# Patient Record
Sex: FEMALE | Race: WHITE | NOT HISPANIC OR LATINO
[De-identification: names, ages, dates, MRNs, and addresses within clinical notes are randomized per-mention and may not be internally consistent; named-entity substitution may affect disease eponyms.]

---

## 2017-03-22 ENCOUNTER — TRANSCRIPTION ENCOUNTER (OUTPATIENT)
Age: 47
End: 2017-03-22

## 2017-03-30 ENCOUNTER — TRANSCRIPTION ENCOUNTER (OUTPATIENT)
Age: 47
End: 2017-03-30

## 2017-08-31 ENCOUNTER — TRANSCRIPTION ENCOUNTER (OUTPATIENT)
Age: 47
End: 2017-08-31

## 2018-04-23 ENCOUNTER — TRANSCRIPTION ENCOUNTER (OUTPATIENT)
Age: 48
End: 2018-04-23

## 2018-05-01 ENCOUNTER — TRANSCRIPTION ENCOUNTER (OUTPATIENT)
Age: 48
End: 2018-05-01

## 2018-10-23 ENCOUNTER — TRANSCRIPTION ENCOUNTER (OUTPATIENT)
Age: 48
End: 2018-10-23

## 2019-06-22 ENCOUNTER — TRANSCRIPTION ENCOUNTER (OUTPATIENT)
Age: 49
End: 2019-06-22

## 2019-12-28 ENCOUNTER — TRANSCRIPTION ENCOUNTER (OUTPATIENT)
Age: 49
End: 2019-12-28

## 2020-01-19 ENCOUNTER — TRANSCRIPTION ENCOUNTER (OUTPATIENT)
Age: 50
End: 2020-01-19

## 2020-02-29 ENCOUNTER — TRANSCRIPTION ENCOUNTER (OUTPATIENT)
Age: 50
End: 2020-02-29

## 2020-10-23 ENCOUNTER — NON-APPOINTMENT (OUTPATIENT)
Age: 50
End: 2020-10-23

## 2020-10-23 ENCOUNTER — APPOINTMENT (OUTPATIENT)
Dept: OPHTHALMOLOGY | Facility: CLINIC | Age: 50
End: 2020-10-23
Payer: COMMERCIAL

## 2020-10-23 PROCEDURE — 92004 COMPRE OPH EXAM NEW PT 1/>: CPT

## 2020-10-23 PROCEDURE — 99072 ADDL SUPL MATRL&STAF TM PHE: CPT

## 2020-10-23 PROCEDURE — 92015 DETERMINE REFRACTIVE STATE: CPT

## 2021-10-18 ENCOUNTER — APPOINTMENT (OUTPATIENT)
Dept: INTERNAL MEDICINE | Facility: CLINIC | Age: 51
End: 2021-10-18

## 2022-01-26 ENCOUNTER — APPOINTMENT (OUTPATIENT)
Dept: INTERNAL MEDICINE | Facility: CLINIC | Age: 52
End: 2022-01-26

## 2022-07-10 ENCOUNTER — EMERGENCY (EMERGENCY)
Facility: HOSPITAL | Age: 52
LOS: 1 days | Discharge: HOME | End: 2022-07-10
Attending: STUDENT IN AN ORGANIZED HEALTH CARE EDUCATION/TRAINING PROGRAM
Payer: COMMERCIAL

## 2022-07-10 VITALS
SYSTOLIC BLOOD PRESSURE: 120 MMHG | OXYGEN SATURATION: 97 % | HEART RATE: 95 BPM | TEMPERATURE: 99 F | HEIGHT: 66 IN | RESPIRATION RATE: 18 BRPM | DIASTOLIC BLOOD PRESSURE: 85 MMHG

## 2022-07-10 PROCEDURE — 73502 X-RAY EXAM HIP UNI 2-3 VIEWS: CPT | Mod: 26,RT

## 2022-07-10 PROCEDURE — 73080 X-RAY EXAM OF ELBOW: CPT

## 2022-07-10 PROCEDURE — 73660 X-RAY EXAM OF TOE(S): CPT

## 2022-07-10 PROCEDURE — 73080 X-RAY EXAM OF ELBOW: CPT | Mod: 26,RT

## 2022-07-10 PROCEDURE — 74177 CT ABD & PELVIS W/CONTRAST: CPT | Mod: 26,MA

## 2022-07-10 PROCEDURE — 99285 EMERGENCY DEPT VISIT HI MDM: CPT | Mod: 25

## 2022-07-10 PROCEDURE — 73502 X-RAY EXAM HIP UNI 2-3 VIEWS: CPT

## 2022-07-10 PROCEDURE — 71260 CT THORAX DX C+: CPT | Mod: MA

## 2022-07-10 PROCEDURE — 71260 CT THORAX DX C+: CPT | Mod: 26,MA

## 2022-07-10 PROCEDURE — 74177 CT ABD & PELVIS W/CONTRAST: CPT | Mod: MA

## 2022-07-10 PROCEDURE — 99284 EMERGENCY DEPT VISIT MOD MDM: CPT | Mod: 25

## 2022-07-10 PROCEDURE — 73610 X-RAY EXAM OF ANKLE: CPT | Mod: 26,LT

## 2022-07-10 PROCEDURE — 73610 X-RAY EXAM OF ANKLE: CPT

## 2022-07-10 PROCEDURE — 73660 X-RAY EXAM OF TOE(S): CPT | Mod: 26,LT

## 2022-07-10 PROCEDURE — 72170 X-RAY EXAM OF PELVIS: CPT

## 2022-07-10 RX ORDER — TETANUS TOXOID, REDUCED DIPHTHERIA TOXOID AND ACELLULAR PERTUSSIS VACCINE, ADSORBED 5; 2.5; 8; 8; 2.5 [IU]/.5ML; [IU]/.5ML; UG/.5ML; UG/.5ML; UG/.5ML
0.5 SUSPENSION INTRAMUSCULAR ONCE
Refills: 0 | Status: COMPLETED | OUTPATIENT
Start: 2022-07-10 | End: 2022-07-10

## 2022-07-10 RX ORDER — LIDOCAINE 4 G/100G
1 CREAM TOPICAL ONCE
Refills: 0 | Status: COMPLETED | OUTPATIENT
Start: 2022-07-10 | End: 2022-07-10

## 2022-07-10 RX ORDER — FENTANYL CITRATE 50 UG/ML
25 INJECTION INTRAVENOUS ONCE
Refills: 0 | Status: DISCONTINUED | OUTPATIENT
Start: 2022-07-10 | End: 2022-07-10

## 2022-07-10 RX ADMIN — LIDOCAINE 1 PATCH: 4 CREAM TOPICAL at 17:23

## 2022-07-10 NOTE — ED PROCEDURE NOTE - CPROC ED INFORMED CONSENT1
Applied
Benefits, risks, and possible complications of procedure explained to patient/caregiver who verbalized understanding and gave verbal consent.

## 2022-07-10 NOTE — ED PROVIDER NOTE - ATTENDING CONTRIBUTION TO CARE
I, Escobar Anderson, performed a history and physical exam of the patient and discussed their management with the resident and/or advanced care provider. I reviewed the resident and/or advanced care provider's note and agree with the documented findings and plan of care. I was present and available for all procedures.    53y/o Female w/ no PMH presenting with right sided rib pain and hip pain as well as LT sided ankle and LE digit pain after falling 6 steps this AM shortly after 0530. Pt states that she awoke at 0530 and attempted to ambulate to the bathroom when she fell down her home steps for a total fo 6 steps. She recalls the events before and after the fall while stating that she landed on her right side. She denies LOC and striking her head; she does not take any blood thinners. Pt called for her 's help who had to lift her and carry her back upstairs. Pt spoke to PCP via video call and was recommended to come into ED due to inability to bear weight on LT ankle and visible swelling.    Well appearing and in NAD, head normal appearing atraumatic, trachea midline, no respiratory distress, lungs cta bilaterally, rrr no murmurs, soft ruq abd ttp ND abdomen, no visible extremity deformities, Alert and oriented, non focal neuro exam, skin warm and dry, normal affect and mood, no c/t/l spine ttp, right post rib pain lower rib cage, r gt pain FROM bilateral hips, left lat ankle pain and left 1st 2nd digit pain, slight oozing from 1st nailbed without nail or nailbed injury slight r elbow ttp FROM bilateral ue's neurovasc intact all 4 ext    well appearing but concerning for msk vs fractures with abd ttp will eval with ctchest ap eval for abd injuries and rib fractures analgesia prn. otherwise no syncope chest pain shortness of breath to suggest pe dissection acs not on ac unlikely ich. dispo pending eval

## 2022-07-10 NOTE — ED PROVIDER NOTE - PATIENT PORTAL LINK FT
You can access the FollowMyHealth Patient Portal offered by [facility] by registering at the following website: [email or web address]. By joining [facility]’s FollowMyHealth portal, you will also be able to view your health information using other applications (apps) compatible with our system.

## 2022-07-10 NOTE — ED PROVIDER NOTE - CLINICAL SUMMARY MEDICAL DECISION MAKING FREE TEXT BOX
51 y/o Female presenting with a mechanical fall. Pt described chest pain and was found to be reproducible, in a patient without cardiac risk factors, most likely MSK in etiology can cannot rule out rib fracture so imaging warranted. Pt also has new RUQ tenderness to palpation and RT costal margin pain which is concerning for potential rib fracture involving either peritoneum or diaphragm which would warrant a CT abd and pelvis to further evaluate. Pt is also presenting with signs of ankle trauma concerning due to inability to bear weight indicating the need for ankle X- ray.    -Newton Torrez, MS4 53 y/o Female with h/o T2DM (Metformin, Ozpemic) p/w mechanical fall at home now with reproducible chest/flank pain & RUQ pain. Given LUIS, high likelihood for MSK pathology as etiology for symptoms, potentially with rib & foot fx. Pt also has new RUQ tenderness to palpation and RT costal margin pain which is concerning for potential rib fracture; will assess for potential intra-abdominal involvement with CT. Pt is also presenting with signs of ankle trauma concerning due to inability to bear weight indicating the need for ankle X- ray.  - Pain control  - Imaging as noted    -Newton Torrez, MS4  Reviewed and edits made by Arnaldo Hauser MD PGY-2

## 2022-07-10 NOTE — ED PROCEDURE NOTE - ATTENDING CONTRIBUTION TO CARE
I, Escobar Anderson, performed a history and physical exam of the patient and discussed their management with the resident and/or advanced care provider. I reviewed the resident and/or advanced care provider's note and agree with the documented findings and plan of care. I was present and available for all procedures.

## 2022-07-10 NOTE — ED PROVIDER NOTE - NS ED ROS FT
Constitutional: No fevers.  HEENT: No visual disturbances.  Pulm: Difficulty taking full breath since fall. No cough.   Cardio: Moderate chest pain reported on right side.   GI: Moderate right sided abdominal pain since fall.  Neuro: No headaches.   MSK: Right lower rib pain, right lateral hip pain, left ankle pain, left 1st and 2nd LE digit pain. GEN: No fever, chills, night sweats, weight loss  EYES: No vision changes, irritation, itchiness  ENT: No sore throat or difficulty swallowing  RESP: as per HPI  CARDIOVASCULAR: No palpitations  GI: No nausea/vomiting, diarrhea, constipation  :  No change in urine output; no dysuria, hematuria, or discharge  MSK: as per HPI  SKIN: No rashes  NEURO: No abnormal movements; no numbness or tingling  Remainder negative, except as per the HPI

## 2022-07-10 NOTE — ED PROVIDER NOTE - OBJECTIVE STATEMENT
The patient is a 53y/o Female w/ no PMH presenting with right sided rib pain and hip pain as well as LT sided ankle and LE digit pain after falling 6 steps this AM shortly after 0530. Pt states that she awoke at 0530 and attempted to ambulate to the bathroom when she fell down her home steps for a total fo 6 steps. She recalls the events before and after the fall while stating that she landed on her right side. She denies LOC and striking her head. Pt called for her 's help who had to lift her and carry her back upstairs. Pt spoke to PCP via video call and was recommended to come into ED due to inability to bear weight on LT ankle and visible swelling. The patient is a 51y/o Female w/ no PMH presenting with right sided rib pain and hip pain as well as LT sided ankle and LE digit pain after falling 6 steps this AM shortly after 0530. Pt states that she awoke at 0530 and attempted to ambulate to the bathroom when she fell down her home steps for a total fo 6 steps. She recalls the events before and after the fall while stating that she landed on her right side. She denies LOC and striking her head; she does not take any blood thinners. Pt called for her 's help who had to lift her and carry her back upstairs. Pt spoke to PCP via video call and was recommended to come into ED due to inability to bear weight on LT ankle and visible swelling.

## 2022-07-10 NOTE — ED PROVIDER NOTE - PHYSICAL EXAMINATION
General: Patient is in mild distress and laying on hospital bed.  HEENT: EOMI, Head is nontender to palpation, atraumatic, and normocephalic. Sclera clear.   Cardiac: Regular rate, with no murmurs or rubs appreciated.  Pulm: Clear to auscultation bilaterally, with no crackles, rhonchi, or wheezes appreciated. Symmetric expansion of chest cavity. Pain noted when asked to take deep breath.   Abd: +ve RUQ pain to palpation. Soft and nondistended abdomen. No guarding. No bruising noted.   Ext: 1 inch ecchymosis noted on pt's right medial humeral epicondyle. Pain with Rt hip flexion. Pain with Tt hip internal and external rotation. LT malleolar swelling that is tender to palpation. LT LE digits 1 and 2 tender to palpation and manipulation.   Skin: Skin is warm and dry.  Neuro: No focal deficits. No neck or spinal tenderness. General: Patient is in mild distress and laying on hospital bed.  HEENT: EOMI, Head is nontender to palpation, atraumatic, and normocephalic. Sclera clear.   Cardiac: Regular rate, with no murmurs or rubs appreciated.  Pulm: Clear to auscultation bilaterally, with no crackles, rhonchi, or wheezes appreciated. Symmetric expansion of chest cavity. + pleuritic chest pain on R flank specifically  Abd: +ve RUQ pain to palpation. Soft and nondistended abdomen. No guarding. No bruising noted.   Ext: 1 inch ecchymosis noted on pt's right medial humeral epicondyle. Pain with Rt hip flexion. Pain with Tt hip internal and external rotation. LT malleolar swelling that is tender to palpation. LT LE digits 1 and 2 tender to palpation and manipulation.   Skin: Skin is warm and dry.  Neuro: No focal deficits. No neck or spinal tenderness.  Psych: appropriate mood & affect

## 2022-07-10 NOTE — ED PROVIDER NOTE - NSFOLLOWUPINSTRUCTIONS_ED_ALL_ED_FT
You were seen in the Emergency Department after a fall at home. Thankfully, we did not identify any life-threatening injuries that would require emergent intervention or hospitalization. We did find that you have a right-sided rib fracture, which is most likely causing your pain on your right side.    You may take Acetaminophen ("Tylenol") and/or Ibuprofen to control your pain according to the package instructions. It works best if you alternate taking these. For example, you may start by taking Tylenol (scheduled every 6 hours), then wait three hours before taking Ibuprofen 400mg (and now that can be scheduled every 6-8 hours). That way, you always are taking something for pain.    We provided you with an incentive spirometer, which will help you to exercise your lungs. You should follow-up with your Primary Care Provider for ongoing evaluation and management during your recovery.    You may also benefit from follow-up with Podiatry if your foot pain persists.    You should return to the Emergency Department if you develop worsening trouble breathing, pain, fever, chills, cough, or other concerning symptoms that you would like evaluated.

## 2022-07-10 NOTE — ED PROVIDER NOTE - NSFOLLOWUPCLINICS_GEN_ALL_ED_FT
[facility]  Podiatry  [address] [place] [address] - [address]rd [place] [address]  Phone: [phone]  Fax:      [facility]  Podiatry  [address] [facility] [facility] - [address]rd [place] [address]  Phone: [phone]  Fax:     [facility]  Orthopedic Surgery  [address] [facility] [address] [address], 3rd & 4th [place] [address]  Phone: [phone]  Fax:

## 2022-07-10 NOTE — ED PROVIDER NOTE - FALL DOWN
05/07/20 0707   Rehab Time/Intention   Evaluation Not Performed other (see comments)  (pt up indep in room.  No indication for acute skilled PT.  Will s/o.)   Rehab Treatment   Discipline physical therapist      stairs/step(s)

## 2022-07-10 NOTE — ED PROVIDER NOTE - PROGRESS NOTE DETAILS
Arnaldo Hauser MD PGY-2  Pt CT scan with 8th rib fx w/o displacement or PTX. No other fx identified on XR's or CT. Will provide incentive spirometry for improvement in lung function & outcomes after rib fx. Arnaldo Hauser MD PGY-2  Applied ACE wrap, CSMs + pre- & post- application. Provided crutches for support. Pt has plans to see [facility] tomorrow for further evaluation & mgmt.    Discussed plan with Dr. Carrero, viky whyte & PCP.

## 2022-10-13 NOTE — ED PROVIDER NOTE - NSICDXPASTMEDICALHX_GEN_ALL_CORE_FT
Good morning     Can I please have CPT codes for patient upcoming surgery     Thank you
PAST MEDICAL HISTORY:  Hypertension during pregnancy

## 2023-01-03 ENCOUNTER — NON-APPOINTMENT (OUTPATIENT)
Age: 53
End: 2023-01-03

## 2023-06-29 ENCOUNTER — NON-APPOINTMENT (OUTPATIENT)
Age: 53
End: 2023-06-29

## 2023-07-29 ENCOUNTER — NON-APPOINTMENT (OUTPATIENT)
Age: 53
End: 2023-07-29

## 2024-03-07 ENCOUNTER — APPOINTMENT (OUTPATIENT)
Dept: VASCULAR SURGERY | Facility: CLINIC | Age: 54
End: 2024-03-07
Payer: COMMERCIAL

## 2024-03-07 VITALS
WEIGHT: 165 LBS | BODY MASS INDEX: 27.16 KG/M2 | HEIGHT: 65.5 IN | HEART RATE: 87 BPM | SYSTOLIC BLOOD PRESSURE: 128 MMHG | TEMPERATURE: 97.8 F | DIASTOLIC BLOOD PRESSURE: 90 MMHG

## 2024-03-07 DIAGNOSIS — I73.9 PERIPHERAL VASCULAR DISEASE, UNSPECIFIED: ICD-10-CM

## 2024-03-07 PROCEDURE — 93922 UPR/L XTREMITY ART 2 LEVELS: CPT

## 2024-03-07 PROCEDURE — 99203 OFFICE O/P NEW LOW 30 MIN: CPT

## 2024-03-07 RX ORDER — TIRZEPATIDE 7.5 MG/.5ML
INJECTION, SOLUTION SUBCUTANEOUS
Refills: 0 | Status: ACTIVE | COMMUNITY

## 2024-06-02 ENCOUNTER — NON-APPOINTMENT (OUTPATIENT)
Age: 54
End: 2024-06-02

## 2024-06-05 ENCOUNTER — NON-APPOINTMENT (OUTPATIENT)
Age: 54
End: 2024-06-05

## 2024-08-06 ENCOUNTER — NON-APPOINTMENT (OUTPATIENT)
Age: 54
End: 2024-08-06

## 2024-11-13 ENCOUNTER — NON-APPOINTMENT (OUTPATIENT)
Age: 54
End: 2024-11-13

## 2024-12-08 ENCOUNTER — NON-APPOINTMENT (OUTPATIENT)
Age: 54
End: 2024-12-08

## 2025-03-12 ENCOUNTER — APPOINTMENT (OUTPATIENT)
Dept: OBGYN | Facility: CLINIC | Age: 55
End: 2025-03-12
Payer: COMMERCIAL

## 2025-03-12 PROCEDURE — 99386 PREV VISIT NEW AGE 40-64: CPT

## 2025-03-12 PROCEDURE — 99459 PELVIC EXAMINATION: CPT

## 2025-03-12 PROCEDURE — 99203 OFFICE O/P NEW LOW 30 MIN: CPT | Mod: 25

## 2025-03-12 PROCEDURE — 96127 BRIEF EMOTIONAL/BEHAV ASSMT: CPT

## 2025-06-27 ENCOUNTER — NON-APPOINTMENT (OUTPATIENT)
Age: 55
End: 2025-06-27